# Patient Record
Sex: FEMALE | Race: WHITE | Employment: UNEMPLOYED | URBAN - METROPOLITAN AREA
[De-identification: names, ages, dates, MRNs, and addresses within clinical notes are randomized per-mention and may not be internally consistent; named-entity substitution may affect disease eponyms.]

---

## 2021-08-17 ENCOUNTER — HOSPITAL ENCOUNTER (OUTPATIENT)
Dept: PREADMISSION TESTING | Age: 61
Discharge: HOME | End: 2021-08-17

## 2021-08-17 VITALS — BODY MASS INDEX: 17.75 KG/M2 | WEIGHT: 94 LBS | HEIGHT: 61 IN

## 2021-08-17 RX ORDER — TAPENTADOL HYDROCHLORIDE 50 MG/1
50 TABLET, FILM COATED ORAL 2 TIMES DAILY
COMMUNITY
End: 2021-09-20

## 2021-08-17 RX ORDER — GENTAMICIN SULFATE 80 MG/50ML
80 INJECTION, SOLUTION INTRAVENOUS ONCE
Status: CANCELLED | OUTPATIENT
Start: 2021-08-17 | End: 2021-08-17

## 2021-08-17 RX ORDER — BUPROPION HYDROCHLORIDE 100 MG/1
100 TABLET ORAL DAILY
COMMUNITY
End: 2021-09-20

## 2021-08-17 RX ORDER — LORAZEPAM 0.5 MG/1
1 TABLET ORAL
COMMUNITY
End: 2021-09-20

## 2021-08-17 RX ORDER — SODIUM CHLORIDE, SODIUM LACTATE, POTASSIUM CHLORIDE, CALCIUM CHLORIDE 600; 310; 30; 20 MG/100ML; MG/100ML; MG/100ML; MG/100ML
125 INJECTION, SOLUTION INTRAVENOUS CONTINUOUS
Status: CANCELLED | OUTPATIENT
Start: 2021-08-17

## 2021-08-17 NOTE — PERIOP NOTES
PAT - SURGICAL PRE-ADMISSION INSTRUCTIONS    NAME:  Marisa Frias                                                          TODAY'S DATE:  8/17/2021    SURGERY DATE:  8/27/2021                                  SURGERY ARRIVAL TIME:   tba    1. Do NOT eat or drink anything, including candy or gum, after MIDNIGHT on 8/26/21 , unless you have specific instructions from your Surgeon or Anesthesia Provider to do so. 2. No smoking 24 hours before surgery. 3. No alcohol 24 hours prior to the day of surgery. 4. No recreational drugs for one week prior to the day of surgery. 5. Leave all valuables, including money/purse, at home. 6. Remove all jewelry, nail polish, makeup (including mascara); no lotions, powders, deodorant, or perfume/cologne/after shave. 7. Glasses/Contact lenses and Dentures may be worn to the hospital.  They will be removed prior to surgery. 8. Call your doctor if symptoms of a cold or illness develop within 24 ours prior to surgery. 9. AN ADULT MUST DRIVE YOU HOME AFTER OUTPATIENT SURGERY. 10. If you are having an OUTPATIENT procedure, please make arrangements for a responsible adult to be with you for 24 hours after your surgery. 11. If you are admitted to the hospital, you will be assigned to a bed after surgery is complete. Normally a family member will not be able to see you until you are in your assigned bed. 12. Visitation Restrictions Explained. Special Instructions:  Labs & EKG 8/18/21  Covid Test 8/24/21, Quarantine requirements discussed  Take these medications the morning of surgery with a sip of water:  Nucynta    Patient Prep:    use CHG solution    These surgical instructions were reviewed with patient during the PAT phone call.

## 2021-08-19 ENCOUNTER — HOSPITAL ENCOUNTER (OUTPATIENT)
Dept: PREADMISSION TESTING | Age: 61
Discharge: HOME | End: 2021-08-19
Payer: MEDICARE

## 2021-08-19 LAB
ATRIAL RATE: 74 BPM
CALCULATED P AXIS, ECG09: 70 DEGREES
CALCULATED R AXIS, ECG10: 74 DEGREES
CALCULATED T AXIS, ECG11: 40 DEGREES
DIAGNOSIS, 93000: NORMAL
HCT VFR BLD AUTO: 38.8 % (ref 35–45)
HGB BLD-MCNC: 12.5 G/DL (ref 12–16)
P-R INTERVAL, ECG05: 168 MS
POTASSIUM SERPL-SCNC: 4 MMOL/L (ref 3.5–5.5)
Q-T INTERVAL, ECG07: 382 MS
QRS DURATION, ECG06: 70 MS
QTC CALCULATION (BEZET), ECG08: 424 MS
VENTRICULAR RATE, ECG03: 74 BPM

## 2021-08-19 PROCEDURE — 93005 ELECTROCARDIOGRAM TRACING: CPT

## 2021-08-19 PROCEDURE — 36415 COLL VENOUS BLD VENIPUNCTURE: CPT

## 2021-08-19 PROCEDURE — 85018 HEMOGLOBIN: CPT

## 2021-08-19 PROCEDURE — 84132 ASSAY OF SERUM POTASSIUM: CPT

## 2021-08-24 ENCOUNTER — HOSPITAL ENCOUNTER (OUTPATIENT)
Dept: PREADMISSION TESTING | Age: 61
Discharge: HOME | End: 2021-08-24
Payer: MEDICARE

## 2021-08-24 PROCEDURE — U0003 INFECTIOUS AGENT DETECTION BY NUCLEIC ACID (DNA OR RNA); SEVERE ACUTE RESPIRATORY SYNDROME CORONAVIRUS 2 (SARS-COV-2) (CORONAVIRUS DISEASE [COVID-19]), AMPLIFIED PROBE TECHNIQUE, MAKING USE OF HIGH THROUGHPUT TECHNOLOGIES AS DESCRIBED BY CMS-2020-01-R: HCPCS

## 2021-08-25 LAB — SARS-COV-2, COV2NT: NOT DETECTED

## 2021-09-20 ENCOUNTER — HOSPITAL ENCOUNTER (OUTPATIENT)
Dept: PREADMISSION TESTING | Age: 61
Discharge: HOME | End: 2021-09-20

## 2021-09-20 VITALS — BODY MASS INDEX: 18.46 KG/M2 | HEIGHT: 60 IN | WEIGHT: 94 LBS

## 2021-09-20 RX ORDER — SODIUM CHLORIDE, SODIUM LACTATE, POTASSIUM CHLORIDE, CALCIUM CHLORIDE 600; 310; 30; 20 MG/100ML; MG/100ML; MG/100ML; MG/100ML
125 INJECTION, SOLUTION INTRAVENOUS CONTINUOUS
Status: CANCELLED | OUTPATIENT
Start: 2021-09-20

## 2021-09-20 RX ORDER — LORAZEPAM 0.5 MG/1
1 TABLET ORAL
COMMUNITY

## 2021-09-20 NOTE — PERIOP NOTES
Dr Santos's office called x 2, No answer x 2 and No voicemail on message 2. Unable to request an updated surgical posting with Abbreviations for surgical procedure.

## 2021-09-20 NOTE — PERIOP NOTES
Dr Tyler Chadwick office called, again no answer, voicemail, and unable to leave message re:  Abbreviations on surgical posting.

## 2021-09-20 NOTE — PERIOP NOTES
No DNR. During PAT interview, patient advised to self quarantine 7 days prior to DOS. Patient instructed to come in for Covid19 testing from 9-11 am or 1-3 pm on 09/21/2021 prior to surgery. Patient instructed to not bring any valuables on DOS including Pocketbook, wallet, jewelry, cell phone, lap top computer or tablet. Patient instructed not to wear make up, powders, deodorant, creams, or lotions on DOS. Patient confirmed receipt of CHG skin preparation and instructions. Patient is aware of one visitor policy in surgical pavilion.

## 2021-09-22 ENCOUNTER — HOSPITAL ENCOUNTER (OUTPATIENT)
Dept: PREADMISSION TESTING | Age: 61
Discharge: HOME | End: 2021-09-22
Payer: MEDICARE

## 2021-09-22 PROCEDURE — U0003 INFECTIOUS AGENT DETECTION BY NUCLEIC ACID (DNA OR RNA); SEVERE ACUTE RESPIRATORY SYNDROME CORONAVIRUS 2 (SARS-COV-2) (CORONAVIRUS DISEASE [COVID-19]), AMPLIFIED PROBE TECHNIQUE, MAKING USE OF HIGH THROUGHPUT TECHNOLOGIES AS DESCRIBED BY CMS-2020-01-R: HCPCS

## 2021-09-24 LAB — SARS-COV-2, NAA: NOT DETECTED

## 2021-09-30 ENCOUNTER — HOSPITAL ENCOUNTER (OUTPATIENT)
Dept: PREADMISSION TESTING | Age: 61
Discharge: HOME | End: 2021-09-30

## 2021-09-30 VITALS — BODY MASS INDEX: 17.84 KG/M2 | WEIGHT: 94.5 LBS | HEIGHT: 61 IN

## 2021-09-30 RX ORDER — SODIUM CHLORIDE, SODIUM LACTATE, POTASSIUM CHLORIDE, CALCIUM CHLORIDE 600; 310; 30; 20 MG/100ML; MG/100ML; MG/100ML; MG/100ML
125 INJECTION, SOLUTION INTRAVENOUS CONTINUOUS
Status: CANCELLED | OUTPATIENT
Start: 2021-10-22

## 2021-09-30 NOTE — PERIOP NOTES
Patient advised of CDC guidelines: must self quarantine for 7 days prior to Cross, come in 4 business days prior to Cross for drive-thru Covid testing and must wear mask/facial covering when coming in. Their temperature will be taken prior to coming in. Patient does not meet criteria for special pop. No hx of sleep apnea or previous screening. Denies hx of MH. PCP is aware of surgery. CHG skin care kit given and process reviewed. Not participating in any research study or clinical trials. Patient instructed when coming in for surgery, do not use any lotions, creams or deodorant. Also to remove all jewelry, hairpins, make-up and nail polish. Instructed to wear something loose fitting and comfortable, easy to take off, easy to put on. Patient not vaccinated.  Coming 10-18 for covid test.

## 2021-10-19 ENCOUNTER — HOSPITAL ENCOUNTER (OUTPATIENT)
Dept: PREADMISSION TESTING | Age: 61
Discharge: HOME | End: 2021-10-19
Payer: MEDICARE

## 2021-10-19 PROCEDURE — U0003 INFECTIOUS AGENT DETECTION BY NUCLEIC ACID (DNA OR RNA); SEVERE ACUTE RESPIRATORY SYNDROME CORONAVIRUS 2 (SARS-COV-2) (CORONAVIRUS DISEASE [COVID-19]), AMPLIFIED PROBE TECHNIQUE, MAKING USE OF HIGH THROUGHPUT TECHNOLOGIES AS DESCRIBED BY CMS-2020-01-R: HCPCS

## 2021-10-21 LAB — SARS-COV-2, NAA: NOT DETECTED

## 2021-10-22 ENCOUNTER — ANESTHESIA (OUTPATIENT)
Dept: SURGERY | Age: 61
End: 2021-10-22
Payer: MEDICARE

## 2021-10-22 ENCOUNTER — APPOINTMENT (OUTPATIENT)
Dept: GENERAL RADIOLOGY | Age: 61
End: 2021-10-22
Attending: PODIATRIST
Payer: MEDICARE

## 2021-10-22 ENCOUNTER — HOSPITAL ENCOUNTER (OUTPATIENT)
Age: 61
Setting detail: OUTPATIENT SURGERY
Discharge: HOME | End: 2021-10-22
Attending: PODIATRIST | Admitting: PODIATRIST
Payer: MEDICARE

## 2021-10-22 ENCOUNTER — ANESTHESIA EVENT (OUTPATIENT)
Dept: SURGERY | Age: 61
End: 2021-10-22
Payer: MEDICARE

## 2021-10-22 VITALS
DIASTOLIC BLOOD PRESSURE: 76 MMHG | BODY MASS INDEX: 17.71 KG/M2 | TEMPERATURE: 97.6 F | HEART RATE: 74 BPM | WEIGHT: 93.8 LBS | SYSTOLIC BLOOD PRESSURE: 127 MMHG | HEIGHT: 61 IN | RESPIRATION RATE: 16 BRPM | OXYGEN SATURATION: 100 %

## 2021-10-22 DIAGNOSIS — M79.671 RIGHT FOOT PAIN: ICD-10-CM

## 2021-10-22 DIAGNOSIS — R26.2 DIFFICULTY IN WALKING INVOLVING ANKLE AND FOOT JOINT: ICD-10-CM

## 2021-10-22 DIAGNOSIS — M20.21 ACQUIRED HALLUX RIGIDUS, RIGHT: Primary | ICD-10-CM

## 2021-10-22 PROCEDURE — 76010000131 HC OR TIME 2 TO 2.5 HR: Performed by: PODIATRIST

## 2021-10-22 PROCEDURE — 64447 NJX AA&/STRD FEMORAL NRV IMG: CPT | Performed by: ANESTHESIOLOGY

## 2021-10-22 PROCEDURE — 77030020269 HC MISC IMPL: Performed by: PODIATRIST

## 2021-10-22 PROCEDURE — 77030038990 HC SCR CNTSNK PG28 -B: Performed by: PODIATRIST

## 2021-10-22 PROCEDURE — C1713 ANCHOR/SCREW BN/BN,TIS/BN: HCPCS | Performed by: PODIATRIST

## 2021-10-22 PROCEDURE — 77030016060 HC NDL NRV BLK TELE -A: Performed by: ANESTHESIOLOGY

## 2021-10-22 PROCEDURE — 74011250636 HC RX REV CODE- 250/636: Performed by: ANESTHESIOLOGY

## 2021-10-22 PROCEDURE — 76060000035 HC ANESTHESIA 2 TO 2.5 HR: Performed by: PODIATRIST

## 2021-10-22 PROCEDURE — 74011250636 HC RX REV CODE- 250/636: Performed by: PODIATRIST

## 2021-10-22 PROCEDURE — 2709999900 HC NON-CHARGEABLE SUPPLY: Performed by: PODIATRIST

## 2021-10-22 PROCEDURE — 74011000250 HC RX REV CODE- 250: Performed by: PODIATRIST

## 2021-10-22 PROCEDURE — 77030039001 HC BIT DRL PG28 -C: Performed by: PODIATRIST

## 2021-10-22 PROCEDURE — 77030006788 HC BLD SAW OSC STRY -B: Performed by: PODIATRIST

## 2021-10-22 PROCEDURE — 88305 TISSUE EXAM BY PATHOLOGIST: CPT

## 2021-10-22 PROCEDURE — 77030002933 HC SUT MCRYL J&J -A: Performed by: PODIATRIST

## 2021-10-22 PROCEDURE — 77030020782 HC GWN BAIR PAWS FLX 3M -B: Performed by: PODIATRIST

## 2021-10-22 PROCEDURE — 64445 NJX AA&/STRD SCIATIC NRV IMG: CPT | Performed by: ANESTHESIOLOGY

## 2021-10-22 PROCEDURE — 77030020268 HC MISC GENERAL SUPPLY: Performed by: PODIATRIST

## 2021-10-22 PROCEDURE — 77030039002 HC WRE K PG28 -B: Performed by: PODIATRIST

## 2021-10-22 PROCEDURE — 76942 ECHO GUIDE FOR BIOPSY: CPT | Performed by: PODIATRIST

## 2021-10-22 PROCEDURE — 74011250636 HC RX REV CODE- 250/636: Performed by: SPECIALIST

## 2021-10-22 PROCEDURE — 76210000021 HC REC RM PH II 0.5 TO 1 HR: Performed by: PODIATRIST

## 2021-10-22 PROCEDURE — 77030040361 HC SLV COMPR DVT MDII -B: Performed by: PODIATRIST

## 2021-10-22 PROCEDURE — 74011000250 HC RX REV CODE- 250: Performed by: NURSE ANESTHETIST, CERTIFIED REGISTERED

## 2021-10-22 PROCEDURE — 77030000032 HC CUF TRNQT ZIMM -B: Performed by: PODIATRIST

## 2021-10-22 DEVICE — MINI MONSTER HEADED, SHORT THREAD, 3.0 X 22MM
Type: IMPLANTABLE DEVICE | Site: FOOT | Status: FUNCTIONAL
Brand: MONSTER SCREW SYSTEM

## 2021-10-22 DEVICE — 2.7 X 12 MM R3CON LOCKING PLATE SCREW
Type: IMPLANTABLE DEVICE | Site: FOOT | Status: FUNCTIONAL
Brand: GORILLA PLATING SYSTEM

## 2021-10-22 DEVICE — MTP, 5 DEGREE, MEDIUM PLATE, RIGHT
Type: IMPLANTABLE DEVICE | Site: FOOT | Status: FUNCTIONAL
Brand: GORILLA PLATING SYSTEM

## 2021-10-22 DEVICE — 2.7 X 10 MM R3CON LOCKING PLATE SCREW
Type: IMPLANTABLE DEVICE | Site: FOOT | Status: FUNCTIONAL
Brand: GORILLA PLATING SYSTEM

## 2021-10-22 DEVICE — 2.7 X 16 MM R3CON LOCKING PLATE SCREW
Type: IMPLANTABLE DEVICE | Site: FOOT | Status: FUNCTIONAL
Brand: GORILLA PLATING SYSTEM

## 2021-10-22 DEVICE — 2.7 X 14 MM R3CON LOCKING PLATE SCREW
Type: IMPLANTABLE DEVICE | Site: FOOT | Status: FUNCTIONAL
Brand: GORILLA PLATING SYSTEM

## 2021-10-22 DEVICE — 4.2 X 16 MM R3CON NON-LOCKING PLATE SCREW
Type: IMPLANTABLE DEVICE | Site: FOOT | Status: FUNCTIONAL
Brand: GORILLA PLATING SYSTEM

## 2021-10-22 RX ORDER — ONDANSETRON 2 MG/ML
4 INJECTION INTRAMUSCULAR; INTRAVENOUS ONCE
Status: CANCELLED | OUTPATIENT
Start: 2021-10-22 | End: 2021-10-22

## 2021-10-22 RX ORDER — NALOXONE HYDROCHLORIDE 0.4 MG/ML
0.1 INJECTION, SOLUTION INTRAMUSCULAR; INTRAVENOUS; SUBCUTANEOUS AS NEEDED
Status: CANCELLED | OUTPATIENT
Start: 2021-10-22

## 2021-10-22 RX ORDER — EPHEDRINE SULFATE/0.9% NACL/PF 50 MG/5 ML
SYRINGE (ML) INTRAVENOUS AS NEEDED
Status: DISCONTINUED | OUTPATIENT
Start: 2021-10-22 | End: 2021-10-22 | Stop reason: HOSPADM

## 2021-10-22 RX ORDER — OXYCODONE AND ACETAMINOPHEN 5; 325 MG/1; MG/1
1 TABLET ORAL
Qty: 32 TABLET | Refills: 0 | Status: SHIPPED | OUTPATIENT
Start: 2021-10-22 | End: 2021-10-30

## 2021-10-22 RX ORDER — DEXTROSE 50 % IN WATER (D50W) INTRAVENOUS SYRINGE
25-50 AS NEEDED
Status: CANCELLED | OUTPATIENT
Start: 2021-10-22

## 2021-10-22 RX ORDER — FENTANYL CITRATE 50 UG/ML
50 INJECTION, SOLUTION INTRAMUSCULAR; INTRAVENOUS
Status: CANCELLED | OUTPATIENT
Start: 2021-10-22

## 2021-10-22 RX ORDER — DEXAMETHASONE SODIUM PHOSPHATE 4 MG/ML
INJECTION, SOLUTION INTRA-ARTICULAR; INTRALESIONAL; INTRAMUSCULAR; INTRAVENOUS; SOFT TISSUE AS NEEDED
Status: DISCONTINUED | OUTPATIENT
Start: 2021-10-22 | End: 2021-10-22 | Stop reason: HOSPADM

## 2021-10-22 RX ORDER — FENTANYL CITRATE 50 UG/ML
25 INJECTION, SOLUTION INTRAMUSCULAR; INTRAVENOUS AS NEEDED
Status: CANCELLED | OUTPATIENT
Start: 2021-10-22

## 2021-10-22 RX ORDER — SODIUM CHLORIDE, SODIUM LACTATE, POTASSIUM CHLORIDE, CALCIUM CHLORIDE 600; 310; 30; 20 MG/100ML; MG/100ML; MG/100ML; MG/100ML
125 INJECTION, SOLUTION INTRAVENOUS CONTINUOUS
Status: DISCONTINUED | OUTPATIENT
Start: 2021-10-22 | End: 2021-10-22 | Stop reason: HOSPADM

## 2021-10-22 RX ORDER — SODIUM CHLORIDE 9 MG/ML
125 INJECTION, SOLUTION INTRAVENOUS CONTINUOUS
Status: CANCELLED | OUTPATIENT
Start: 2021-10-22

## 2021-10-22 RX ORDER — ROPIVACAINE HYDROCHLORIDE 5 MG/ML
INJECTION, SOLUTION EPIDURAL; INFILTRATION; PERINEURAL
Status: COMPLETED | OUTPATIENT
Start: 2021-10-22 | End: 2021-10-22

## 2021-10-22 RX ORDER — CLINDAMYCIN PHOSPHATE 900 MG/50ML
900 INJECTION, SOLUTION INTRAVENOUS ONCE
Status: COMPLETED | OUTPATIENT
Start: 2021-10-22 | End: 2021-10-22

## 2021-10-22 RX ORDER — MIDAZOLAM HYDROCHLORIDE 1 MG/ML
INJECTION, SOLUTION INTRAMUSCULAR; INTRAVENOUS
Status: COMPLETED | OUTPATIENT
Start: 2021-10-22 | End: 2021-10-22

## 2021-10-22 RX ORDER — NALOXONE HYDROCHLORIDE 4 MG/.1ML
SPRAY NASAL
Qty: 1 EACH | Refills: 0 | Status: SHIPPED | OUTPATIENT
Start: 2021-10-22

## 2021-10-22 RX ORDER — PROPOFOL 10 MG/ML
INJECTION, EMULSION INTRAVENOUS
Status: DISCONTINUED | OUTPATIENT
Start: 2021-10-22 | End: 2021-10-22 | Stop reason: HOSPADM

## 2021-10-22 RX ORDER — PROPOFOL 10 MG/ML
INJECTION, EMULSION INTRAVENOUS AS NEEDED
Status: DISCONTINUED | OUTPATIENT
Start: 2021-10-22 | End: 2021-10-22 | Stop reason: HOSPADM

## 2021-10-22 RX ORDER — MAGNESIUM SULFATE 100 %
4 CRYSTALS MISCELLANEOUS AS NEEDED
Status: CANCELLED | OUTPATIENT
Start: 2021-10-22

## 2021-10-22 RX ORDER — KETAMINE HCL IN 0.9 % NACL 50 MG/5 ML
SYRINGE (ML) INTRAVENOUS AS NEEDED
Status: DISCONTINUED | OUTPATIENT
Start: 2021-10-22 | End: 2021-10-22 | Stop reason: HOSPADM

## 2021-10-22 RX ORDER — BUPIVACAINE HYDROCHLORIDE 5 MG/ML
INJECTION, SOLUTION EPIDURAL; INTRACAUDAL AS NEEDED
Status: DISCONTINUED | OUTPATIENT
Start: 2021-10-22 | End: 2021-10-22 | Stop reason: HOSPADM

## 2021-10-22 RX ADMIN — ROPIVACAINE HYDROCHLORIDE 6 ML: 5 INJECTION, SOLUTION EPIDURAL; INFILTRATION; PERINEURAL at 11:17

## 2021-10-22 RX ADMIN — MIDAZOLAM 2 MG: 1 INJECTION INTRAMUSCULAR; INTRAVENOUS at 11:17

## 2021-10-22 RX ADMIN — Medication 10 MG: at 13:28

## 2021-10-22 RX ADMIN — MEPIVACAINE HYDROCHLORIDE 25 ML: 20 INJECTION, SOLUTION EPIDURAL; INFILTRATION at 11:17

## 2021-10-22 RX ADMIN — CLINDAMYCIN PHOSPHATE 600 MG: 900 INJECTION, SOLUTION INTRAVENOUS at 12:09

## 2021-10-22 RX ADMIN — SODIUM CHLORIDE, SODIUM LACTATE, POTASSIUM CHLORIDE, AND CALCIUM CHLORIDE: 600; 310; 30; 20 INJECTION, SOLUTION INTRAVENOUS at 13:15

## 2021-10-22 RX ADMIN — PROPOFOL 20 MG: 10 INJECTION, EMULSION INTRAVENOUS at 12:10

## 2021-10-22 RX ADMIN — SODIUM CHLORIDE, SODIUM LACTATE, POTASSIUM CHLORIDE, AND CALCIUM CHLORIDE 125 ML/HR: 600; 310; 30; 20 INJECTION, SOLUTION INTRAVENOUS at 10:34

## 2021-10-22 RX ADMIN — PROPOFOL 20 MG: 10 INJECTION, EMULSION INTRAVENOUS at 12:14

## 2021-10-22 RX ADMIN — PROPOFOL 20 MG: 10 INJECTION, EMULSION INTRAVENOUS at 12:12

## 2021-10-22 RX ADMIN — Medication 20 MG: at 13:30

## 2021-10-22 RX ADMIN — PROPOFOL 200 MCG/KG/MIN: 10 INJECTION, EMULSION INTRAVENOUS at 12:09

## 2021-10-22 NOTE — ANESTHESIA POSTPROCEDURE EVALUATION
Procedure(s):  RIGHT MPJ FUSION AND REMOVAL OF PAINFUL LESION W/C-ARM  **COVID TEST DONE 9/22/21 RESULTS STILL PENDING** REG + POPLITEAL SAPHENOUS ANESTHESIA.    regional    Anesthesia Post Evaluation      Multimodal analgesia: multimodal analgesia used between 6 hours prior to anesthesia start to PACU discharge  Patient location during evaluation: bedside  Patient participation: complete - patient participated  Level of consciousness: awake  Pain management: adequate  Airway patency: patent  Anesthetic complications: no  Cardiovascular status: acceptable  Respiratory status: acceptable  Hydration status: acceptable  Post anesthesia nausea and vomiting:  none  Final Post Anesthesia Temperature Assessment:  Normothermia (36.0-37.5 degrees C)      INITIAL Post-op Vital signs:   Vitals Value Taken Time   /76 10/22/21 1515   Temp 36.7 °C (98 °F) 10/22/21 1433   Pulse 65 10/22/21 1518   Resp 16 10/22/21 1433   SpO2 100 % 10/22/21 1518   Vitals shown include unvalidated device data.

## 2021-10-22 NOTE — ANESTHESIA PROCEDURE NOTES
Peripheral Block    Start time: 10/22/2021 10:58 AM  End time: 10/22/2021 11:10 AM  Performed by: Yuli Kern MD  Authorized by: Yuli Kern MD       Pre-procedure:    Indications: at surgeon's request, post-op pain management, procedure for pain and primary anesthetic    Preanesthetic Checklist: patient identified, risks and benefits discussed, site marked, timeout performed, anesthesia consent given and patient being monitored      Block Type:   Block Type:  Popliteal  Laterality:  Right  Monitoring:  Standard ASA monitoring, responsive to questions, oxygen, continuous pulse ox, frequent vital sign checks and heart rate  Injection Technique:  Single shot  Procedures: ultrasound guided and nerve stimulator    Patient Position: right lateral decubitus  Prep: DuraPrep    Location:  Mid thigh  Needle Type:  Stimuplex  Needle Gauge:  20 G  Needle Localization:  Anatomical landmarks, ultrasound guidance and nerve stimulator  Medication Injected:  Midazolam (VERSED) injection, 2 mg  mepivacaine (PF) 2 % (POLOCAINE) injection, 25 mL  Med Admin Time: 10/22/2021 11:17 AM    Assessment:  Number of attempts:  1  Injection Assessment:  Incremental injection every 5 mL, negative aspiration for CSF, no paresthesia, ultrasound image on chart, low pressure verified by pressure monitor, no intravascular symptoms, negative aspiration for blood and local visualized surrounding nerve on ultrasound  Patient tolerance:  Patient tolerated the procedure well with no immediate complications

## 2021-10-22 NOTE — PERIOP NOTES
Reviewed PTA medication list with patient/caregiver and patient/caregiver denies any additional medications. Patient admits to having a responsible adult care for them at home for at least 24 hours after surgery. Patient encouraged to use gown warming system and informed that using said warming gown to regulate body temperature prior to a procedure has been shown to help reduce the risks of blood clots and infection. Patient's pharmacy of choice verified and documented in PTA medication section. Dual skin assessment & fall risk band verification completed with Kyra Be RN.

## 2021-10-22 NOTE — BRIEF OP NOTE
Brief Op Note    Drs:Eryn MORGAN  [address] [place] [address], [address] [place] [address]  [phone](T)  [phone](W)  [phone](P)    1. [facility], DPMEHRAN, FACFAS  2. Assistant:nurse  3. Pre-op Dx:hallux rigidus right foot, Soft tissue lesion  4. Post Op Dx:same with degenerative joint disease right foot 1st MPJ  5. Procedure:1st MPJ arthrodesis, Total excision of soft tissue mass plantar right foot. 6. Pathology: soft tissue mass  7. Anesthesia:Mac with local(14cc's 1/4% marcaine plain, 2cc's lidocaine 1% plain, 20 cc's post op of .5% marcaine plain. 8. Hemostasis: ankle tourniquet @250 mmgh for 90 minutes. 9. EBL:2cc's  10. Injectable:see above and 1cc of paragon 28 amnionic fluid graft. The patient tolerated the procedure and anesthesia well.

## 2021-10-22 NOTE — PERIOP NOTES
AVS med list reviewed, no duplicates noted. D/C instructions reviewed with patient and Mary Lou Santana, opportunity for questions provided.

## 2021-10-22 NOTE — DISCHARGE INSTRUCTIONS
DISCHARGE SUMMARY from Nurse    Non weight bearing  Elevate effected foot  Use ice 3x a day for 30 minutes    PATIENT INSTRUCTIONS:    After general anesthesia or intravenous sedation, for 24 hours or while taking prescription Narcotics:  · Limit your activities  · Do not drive and operate hazardous machinery  · Do not make important personal or business decisions  · Do  not drink alcoholic beverages  · If you have not urinated within 8 hours after discharge, please contact your surgeon on call. Report the following to your surgeon:  · Excessive pain, swelling, redness or odor of or around the surgical area  · Temperature over 100.5  · Nausea and vomiting lasting longer than 4 hours or if unable to take medications  · Any signs of decreased circulation or nerve impairment to extremity: change in color, persistent  numbness, tingling, coldness or increase pain  · Any questions    What to do at Home:  Recommended activity: No heavy lifting, pushing, pulling with the implant side for 2 months and No driving while on analgesics,     If you experience any of the following symptoms above, please follow up with Dr. Lorenza Wood. *  Please give a list of your current medications to your Primary Care Provider. *  Please update this list whenever your medications are discontinued, doses are      changed, or new medications (including over-the-counter products) are added. *  Please carry medication information at all times in case of emergency situations. These are general instructions for a healthy lifestyle:    No smoking/ No tobacco products/ Avoid exposure to second hand smoke  Surgeon General's Warning:  Quitting smoking now greatly reduces serious risk to your health.     Obesity, smoking, and sedentary lifestyle greatly increases your risk for illness    A healthy diet, regular physical exercise & weight monitoring are important for maintaining a healthy lifestyle    You may be retaining fluid if you have a history of heart failure or if you experience any of the following symptoms:  Weight gain of 3 pounds or more overnight or 5 pounds in a week, increased swelling in our hands or feet or shortness of breath while lying flat in bed. Please call your doctor as soon as you notice any of these symptoms; do not wait until your next office visit. Patient armband removed and shredded    The discharge information has been reviewed with the patient and caregiver. The patient and caregiver verbalized understanding. Discharge medications reviewed with the patient and caregiver and appropriate educational materials and side effects teaching were provided.   ___________________________________________________________________________________________________________________________________

## 2021-10-22 NOTE — ANESTHESIA PREPROCEDURE EVALUATION
Relevant Problems   No relevant active problems       Anesthetic History   No history of anesthetic complications            Review of Systems / Medical History  Patient summary reviewed, nursing notes reviewed and pertinent labs reviewed    Pulmonary    COPD               Neuro/Psych         Psychiatric history     Cardiovascular                  Exercise tolerance: >4 METS     GI/Hepatic/Renal  Within defined limits              Endo/Other        Arthritis     Other Findings            Physical Exam    Airway  Mallampati: II  TM Distance: 4 - 6 cm  Neck ROM: normal range of motion   Mouth opening: Normal     Cardiovascular  Regular rate and rhythm,  S1 and S2 normal,  no murmur, click, rub, or gallop             Dental    Dentition: Edentulous     Pulmonary  Breath sounds clear to auscultation               Abdominal  GI exam deferred       Other Findings            Anesthetic Plan    ASA: 2  Anesthesia type: regional - popliteal fossa block and saphenous block  Risk and benefits fully explained to the patient including bleeding, headache, nerve damage, infection, nausea, back pain, and hemodynamic changes. Patient understands and agrees to the procedure.     Post-op pain plan if not by surgeon: peripheral nerve block single      Anesthetic plan and risks discussed with: Patient

## 2021-10-22 NOTE — PROGRESS NOTES
Interval Note    [facility] Patient was seen for H&P clearance less than 30 days ago. We discussed the procedure and agreed on doing a great toe joint fusion(arthrodesis). She has signed an informed consent. No guarantees given.

## 2021-10-22 NOTE — ANESTHESIA PROCEDURE NOTES
Peripheral Block    Start time: 10/22/2021 11:13 AM  End time: 10/22/2021 11:17 AM  Performed by: Tushar Arevalo MD  Authorized by: Tushar Arevalo MD       Pre-procedure: Indications: at surgeon's request, post-op pain management, procedure for pain and primary anesthetic    Preanesthetic Checklist: patient identified, risks and benefits discussed, site marked, anesthesia consent given and patient being monitored      Block Type:   Block Type:   Adductor canal block  Laterality:  Right  Monitoring:  Standard ASA monitoring, responsive to questions, oxygen, continuous pulse ox, frequent vital sign checks and heart rate  Injection Technique:  Single shot  Procedures: ultrasound guided    Patient Position: right lateral decubitus  Prep: DuraPrep    Location:  Mid thigh  Needle Type:  Stimuplex  Needle Gauge:  20 G  Needle Localization:  Anatomical landmarks and ultrasound guidance  Medication Injected:  Ropivacaine (PF) (NAROPIN) 5 mg/mL (0.5 %) injection, 6 mL  Med Admin Time: 10/22/2021 11:17 AM    Assessment:  Number of attempts:  1  Injection Assessment:  Incremental injection every 5 mL, negative aspiration for CSF, no paresthesia, ultrasound image on chart, low pressure verified by pressure monitor, no intravascular symptoms, local visualized surrounding nerve on ultrasound and negative aspiration for blood  Patient tolerance:  Patient tolerated the procedure well with no immediate complications

## 2021-10-23 NOTE — OP NOTES
[facility] [place]  OPERATIVE REPORT    Name:  Ivory Rios  MR#:   [id number]  :  1960  ACCOUNT #:  [de-identified]  DATE OF SERVICE:  10/22/2021    PREOPERATIVE DIAGNOSES:  Right foot hallux rigidus, right foot severe pain, difficulty walking; painful soft tissue mass, plantar foot, 1.5 cm x 1 cm. 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 PROCEDURE PERFORMED:  First metatarsophalangeal joint arthrodesis of the right foot with a Sprakers 28 Lapidus plate x5 screws and total excision of the soft tissue mass, plantar right foot. SURGEON:  Aris Greenberg DPM    ASSISTANT:  Nurse    ANESTHESIA:  Local and IV sedation. COMPLICATIONS:  None. SPECIMENS REMOVED:  bone    IMPLANTS:  Sprakers 28 plates and cannulated screws    ESTIMATED BLOOD LOSS:  2 mL. PROCEDURE:  The patient was taken to the OR and placed in the supine position on the operating table with local and IV sedation achieved. Preoperative anesthesia consisted of 14 mL of 0.25% Marcaine plain and 2 mL of lidocaine plain. Postoperative injection, 1 mL of dexamethasone phosphate, 1 mL of Sprakers 28 amniotic fluid graft, and an additional 20 mL of 0.5% Marcaine plain at this time. The right foot was prepped and draped in the usual sterile manner. A Webril was placed around the right ankle. A tourniquet was placed at that level, inflated to 250 mmHg. Tourniquet time was 90 minutes, and the estimated blood loss 2 mL and complications none. Utilizing a sterile #15 blade, a linear incision was made along the first metatarsophalangeal joint. Once through full skin thickness, bleeders were ligated as necessary. Blunt and sharp dissection down to the first metatarsal capsule layer and neurovascular structures were retracted.   Once through the capsule with a linear incision, a marked amount of osteophytic perforations (spurs) was observed. They were resected with the sagittal saw and then utilizing the Lapidus guide instrumentation, I reamed the first metatarsal head, checking the position with the C-arm before reaming and then I did the same thing for the base and then I used the fenestration drill bit for both sides. Next, I started to apply the Minturn 28 guide for the compression screw first, utilizing the C-arm. Great compression was observed and then I started to apply the screws and the plate for great improved anatomical position and compression. The wound was flushed with copious amounts of normal saline with Betadine solution and water. Utilizing 3-0 Vicryl for deep cutaneous tissues and capsule and then 4-0 Vicryl for subcutaneous tissues and then 2-0 nylon for reapproximation of skin. Next, attention was directed to the plantar aspect of the foot. Proximal to the metatarsals, on the lateral central aspect of the foot, two full skin thickness semi-elliptical incisions were made over the plantar hyperkeratotic lesion. It was taken in toto and passed off to Pathology for evaluation and diagnosis. Next, the wound was flushed with copious amounts of normal saline with Betadine solution and then I reapproximated the skin with 2-0 nylon. Next, Xeroform gauze was placed over both incisions, and a Betadine wet-to-dry dressing was applied first with 4x4s and then a clean cotton roll and two Ace wraps. The capillary refill was immediate to all digits. The patient was taken to recovery room stable and an intact vascular status.       YOUSIF Conde_ANN MARIETT_I/  D:  10/22/2021 14:36  T:  10/23/2021 6:49  JOB #:  [id number]

## 2022-03-18 PROBLEM — M79.671 RIGHT FOOT PAIN: Status: ACTIVE | Noted: 2021-10-22

## 2022-03-19 PROBLEM — M20.21 ACQUIRED HALLUX RIGIDUS, RIGHT: Status: ACTIVE | Noted: 2021-10-22

## 2022-03-19 PROBLEM — R26.2 DIFFICULTY IN WALKING INVOLVING ANKLE AND FOOT JOINT: Status: ACTIVE | Noted: 2021-10-22

## 2025-04-08 ENCOUNTER — HOSPITAL ENCOUNTER (OUTPATIENT)
Facility: HOSPITAL | Age: 65
Setting detail: RECURRING SERIES
Discharge: HOME | End: 2025-04-11
Payer: MEDICARE

## 2025-04-08 PROCEDURE — 97161 PT EVAL LOW COMPLEX 20 MIN: CPT

## 2025-04-23 ENCOUNTER — HOSPITAL ENCOUNTER (OUTPATIENT)
Facility: HOSPITAL | Age: 65
Setting detail: RECURRING SERIES
Discharge: HOME | End: 2025-04-26
Payer: MEDICARE

## 2025-04-23 PROCEDURE — 97140 MANUAL THERAPY 1/> REGIONS: CPT

## 2025-04-23 PROCEDURE — 97112 NEUROMUSCULAR REEDUCATION: CPT

## 2025-04-23 PROCEDURE — 97110 THERAPEUTIC EXERCISES: CPT

## 2025-04-23 NOTE — PROGRESS NOTES
PHYSICAL / OCCUPATIONAL THERAPY - DAILY TREATMENT NOTE     Patient Name: Sangita Penaloza    Date: 2025    : 1960  Insurance: Payor: [facility] MEDICARE / Plan: DONELL FULL DUAL ADVANTAGE 2 / Product Type: *No Product type* /      Patient  verified Yes     Visit #   Current / Total 2 24   Time   In / Out 12:00 12:40   Pain   In / Out 8 8   Subjective Functional Status/Changes: The table slides really bothered my shoulder    Changes to:  Allergies, Med Hx, Sx Hx?   no       TREATMENT AREA =  Presence of left artificial shoulder joint [Z96.612]  Pain in left shoulder [M25.512]    If an interpreting service is utilized for treatment of this patient, the contents of this document represent the material reviewed with the patient via the .     OBJECTIVE    Therapeutic Procedures:  Tx Min Billable or 1:1 Min (if diff from Tx Min) Procedure, Rationale, Specifics   22  72927 Therapeutic Exercise (timed):  increase ROM, strength, coordination, balance, and proprioception to improve patient's ability to progress to PLOF and address remaining functional goals. (see flow sheet as applicable)    Details if applicable:       10  75810 Neuromuscular Re-Education (timed):  improve balance, coordination, kinesthetic sense, posture, core stability and proprioception to improve patient's ability to develop conscious control of individual muscles and awareness of position of extremities in order to progress to PLOF and address remaining functional goals. (see flow sheet as applicable)    Details if applicable:     8  68617 Manual Therapy (timed):  decrease pain, increase ROM, and increase tissue extensibility to improve patient's ability to progress to PLOF and address remaining functional goals.  The manual therapy interventions were performed at a separate and distinct time from the therapeutic activities interventions . Details: PROM as tolerated       Details if applicable:     40  [facility] Totals Reminder: bill

## 2025-04-29 ENCOUNTER — HOSPITAL ENCOUNTER (OUTPATIENT)
Facility: HOSPITAL | Age: 65
Setting detail: RECURRING SERIES
End: 2025-04-29
Payer: MEDICARE

## 2025-05-01 ENCOUNTER — APPOINTMENT (OUTPATIENT)
Facility: HOSPITAL | Age: 65
End: 2025-05-01
Payer: MEDICARE

## 2025-05-06 ENCOUNTER — APPOINTMENT (OUTPATIENT)
Facility: HOSPITAL | Age: 65
End: 2025-05-06
Payer: MEDICARE

## 2025-05-08 ENCOUNTER — HOSPITAL ENCOUNTER (OUTPATIENT)
Facility: HOSPITAL | Age: 65
Setting detail: RECURRING SERIES
End: 2025-05-08
Payer: MEDICARE

## 2025-05-08 ENCOUNTER — TELEPHONE (OUTPATIENT)
Facility: HOSPITAL | Age: 65
End: 2025-05-08

## 2025-05-13 ENCOUNTER — TELEPHONE (OUTPATIENT)
Facility: HOSPITAL | Age: 65
End: 2025-05-13

## 2025-05-13 ENCOUNTER — HOSPITAL ENCOUNTER (OUTPATIENT)
Facility: HOSPITAL | Age: 65
Setting detail: RECURRING SERIES
End: 2025-05-13
Payer: MEDICARE

## 2025-05-13 NOTE — TELEPHONE ENCOUNTER
LVM re: leaving appointment. Apologized to patient for the miscommunication with arrival to PT this morning. Reminded her of her next appointment on 5/15/25 at 10am

## 2025-05-15 ENCOUNTER — HOSPITAL ENCOUNTER (OUTPATIENT)
Facility: HOSPITAL | Age: 65
Setting detail: RECURRING SERIES
Discharge: HOME | End: 2025-05-18
Payer: MEDICARE

## 2025-05-15 PROCEDURE — 97530 THERAPEUTIC ACTIVITIES: CPT

## 2025-05-15 PROCEDURE — 97110 THERAPEUTIC EXERCISES: CPT

## 2025-05-15 NOTE — PROGRESS NOTES
PHYSICAL / OCCUPATIONAL THERAPY - DAILY TREATMENT NOTE     Patient Name: Sangita Penaloza    Date: 5/15/2025    : 1960  Insurance: Payor: [facility] MEDICARE / Plan: ANTH FULL DUAL ADVANTAGE 2 / Product Type: *No Product type* /      Patient  verified Yes     Visit #   Current / Total 3 24   Time   In / Out 1000 1030   Pain   In / Out 5 5   Subjective Functional Status/Changes: Doing better - 25% better, still weak. Will see another doctor to see if a \"piece of metal\" in the shoulder can be removed. Primary surgeon \"could not find it\", but was shown on X-Ray per patient.    Changes to:  Allergies, Med Hx, Sx Hx?   no       TREATMENT AREA =  Presence of left artificial shoulder joint [Z96.612]  Pain in left shoulder [M25.512]    If an interpreting service is utilized for treatment of this patient, the contents of this document represent the material reviewed with the patient via the .     OBJECTIVE    Modalities Rationale:     decrease edema, decrease inflammation, and decrease pain to improve patient's ability to progress to PLOF and address remaining functional goals.     min [] Estim Unattended, type/location:                                      []  w/ice    []  w/heat    min [] Estim Attended, type/location:                                     []  w/US     []  w/ice    []  w/heat    []  TENS insruct      min []  Mechanical Traction: type/lbs                   []  pro   []  sup   []  int   []  cont    []  before manual    []  after manual    min []  Ultrasound, settings/location:      min []  Iontophoresis w/ dexamethasone, location:                                               []  take home patch       []  in clinic        min  unbilled []  Ice     []  Heat    location/position:     min []  Paraffin,  details:     min []  Vasopneumatic Device, press/temp:     min []  Whirlpool / Fluido:    If using vaso (only need to measure limb vaso being performed on)      pre-treatment girth :

## 2025-05-20 ENCOUNTER — TELEPHONE (OUTPATIENT)
Facility: HOSPITAL | Age: 65
End: 2025-05-20

## 2025-05-20 NOTE — TELEPHONE ENCOUNTER
LVM re: No Show of today's appointment, as well as notified her of being dischraged from PT secondary to attendance policy. Patient informed of need for new referral to continue PT.

## 2025-05-22 ENCOUNTER — APPOINTMENT (OUTPATIENT)
Facility: HOSPITAL | Age: 65
End: 2025-05-22
Payer: MEDICARE

## 2025-05-27 ENCOUNTER — APPOINTMENT (OUTPATIENT)
Facility: HOSPITAL | Age: 65
End: 2025-05-27
Payer: MEDICARE

## 2025-05-29 ENCOUNTER — APPOINTMENT (OUTPATIENT)
Facility: HOSPITAL | Age: 65
End: 2025-05-29
Payer: MEDICARE

## (undated) DEVICE — ZIMMER® STERILE DISPOSABLE TOURNIQUET CUFF WITH PROTECTIVE SLEEVE AND PLC, SINGLE PORT, SINGLE BLADDER, 18 IN. (46 CM)

## (undated) DEVICE — DRAPE XR C ARM 41X74IN LF --

## (undated) DEVICE — K-WIRE, SINGLE ENDED TROCAR TIP, SMOOTH, 1.1MM X 150 MM
Type: IMPLANTABLE DEVICE | Site: FOOT | Status: NON-FUNCTIONAL
Brand: MONSTER SCREW SYSTEM
Removed: 2021-10-22

## (undated) DEVICE — P28, K-WIRE, 1.6 X 150 MM, SINGLE TROCAR, SMOOTH, SS
Type: IMPLANTABLE DEVICE | Site: FOOT | Status: NON-FUNCTIONAL
Brand: MULTI SYSTEM
Removed: 2021-10-22

## (undated) DEVICE — DRILL, 2.0 X 110MM, SOLID, MEASURING, AO: Brand: BABY GORILLA®/GORILLA® PLATING SYSTEM

## (undated) DEVICE — PRECISION (9.0 X 0.51 X 25.0MM)

## (undated) DEVICE — 3.5 X 14 MM R3CON NON-LOCKING PLATE SCREW
Type: IMPLANTABLE DEVICE | Site: FOOT | Status: NON-FUNCTIONAL
Brand: GORILLA PLATING SYSTEM
Removed: 2021-10-22

## (undated) DEVICE — GLOVE ORANGE PI 8 1/2   MSG9085

## (undated) DEVICE — C-ARMOR C-ARM EQUIPMENT COVERS CLEAR STERILE UNIVERSAL FIT 12 PER CASE: Brand: C-ARMOR

## (undated) DEVICE — MTP SPIN GUARD™ FEMALE REAMER, 19MM: Brand: BABY GORILLA®/GORILLA® PLATING SYSTEM

## (undated) DEVICE — DRAPE EQUIP CARM MINI STRL

## (undated) DEVICE — DRILL,  2.1 X 120MM, CANNULATED, AO: Brand: MONSTER SCREW SYSTEM

## (undated) DEVICE — SUT MONOCRYL PLUS UD 4-0 --

## (undated) DEVICE — DRILL,  2.4 X 140MM, SOLID, MEASURING, LONG, AO: Brand: BABY GORILLA®/GORILLA® PLATING SYSTEM

## (undated) DEVICE — PACK PROCEDURE SURG EXTREMITY CUST

## (undated) DEVICE — GARMENT,MEDLINE,DVT,INT,CALF,MED, GEN2: Brand: MEDLINE

## (undated) DEVICE — MTP SPIN GUARD™ MALE REAMER, 19MM: Brand: BABY GORILLA®/GORILLA® PLATING SYSTEM

## (undated) DEVICE — PREP SKN CHLRAPRP APL 26ML STR --

## (undated) DEVICE — COUNTERSINK, 3.0 HEADED: Brand: MONSTER® SCREW SYSTEM

## (undated) DEVICE — BANDAGE,GAUZE,BULKEE II,4.5"X4.1YD,STRL: Brand: MEDLINE

## (undated) DEVICE — BANDAGE COMPR W4INXL10YD WHITE/BEIGE E MTRX HK LOOP CLSR

## (undated) DEVICE — REM POLYHESIVE ADULT PATIENT RETURN ELECTRODE: Brand: VALLEYLAB

## (undated) DEVICE — OLIVE WIRE, SMOOTH, 1.4MM: Brand: BABY GORILLA/GORILLA PLATING SYSTEM

## (undated) DEVICE — TOWEL,OR,DSP,ST,BLUE,STD,4/PK,20PK/CS: Brand: MEDLINE

## (undated) DEVICE — SYR 10ML LUER LOK 1/5ML GRAD --

## (undated) DEVICE — BONE FENESTRATION PERFORATOR: Brand: BABY GORILLA®/GORILLA® PLATING SYSTEM

## (undated) DEVICE — MTP SPIN GUARD FEMALE REAMER PROTECTOR SLEEVE, 19MM: Brand: BABY GORILLA/GORILLA PLATING SYSTEM